# Patient Record
Sex: FEMALE | Race: BLACK OR AFRICAN AMERICAN | Employment: OTHER | URBAN - METROPOLITAN AREA
[De-identification: names, ages, dates, MRNs, and addresses within clinical notes are randomized per-mention and may not be internally consistent; named-entity substitution may affect disease eponyms.]

---

## 2017-03-15 ENCOUNTER — HOSPITAL ENCOUNTER (OUTPATIENT)
Dept: WOMENS IMAGING | Age: 67
Discharge: HOME | End: 2017-03-16
Attending: OBSTETRICS & GYNECOLOGY | Admitting: OBSTETRICS & GYNECOLOGY

## 2017-03-15 DIAGNOSIS — Z12.31 VISIT FOR SCREENING MAMMOGRAM: ICD-10-CM

## 2017-03-25 ENCOUNTER — HOSPITAL ENCOUNTER (OUTPATIENT)
Dept: WOMENS IMAGING | Age: 67
Discharge: HOME | End: 2017-03-15

## 2017-08-03 ENCOUNTER — OFFICE VISIT (OUTPATIENT)
Dept: ORTHOPEDIC SURGERY | Age: 67
End: 2017-08-03

## 2017-08-03 VITALS
TEMPERATURE: 98.1 F | BODY MASS INDEX: 36.96 KG/M2 | HEART RATE: 91 BPM | WEIGHT: 230 LBS | DIASTOLIC BLOOD PRESSURE: 87 MMHG | HEIGHT: 66 IN | SYSTOLIC BLOOD PRESSURE: 132 MMHG

## 2017-08-03 DIAGNOSIS — Z96.649 STATUS POST REVISION OF TOTAL HIP REPLACEMENT: Primary | ICD-10-CM

## 2017-08-03 DIAGNOSIS — M54.5 LOW BACK PAIN, UNSPECIFIED BACK PAIN LATERALITY, UNSPECIFIED CHRONICITY, WITH SCIATICA PRESENCE UNSPECIFIED: ICD-10-CM

## 2017-08-03 DIAGNOSIS — Z96.642 HISTORY OF TOTAL LEFT HIP ARTHROPLASTY: ICD-10-CM

## 2017-08-03 PROCEDURE — 99213 OFFICE O/P EST LOW 20 MIN: CPT | Performed by: PHYSICIAN ASSISTANT

## 2017-08-03 RX ORDER — CYCLOBENZAPRINE HCL 10 MG
10 TABLET ORAL 3 TIMES DAILY PRN
Qty: 40 TABLET | Refills: 0 | Status: SHIPPED | OUTPATIENT
Start: 2017-08-03 | End: 2017-08-13

## 2017-08-03 RX ORDER — TRAMADOL HYDROCHLORIDE 50 MG/1
50 TABLET ORAL EVERY 6 HOURS PRN
Qty: 40 TABLET | Refills: 0 | Status: SHIPPED | OUTPATIENT
Start: 2017-08-03 | End: 2017-08-13

## 2017-08-09 ENCOUNTER — TELEPHONE (OUTPATIENT)
Dept: ORTHOPEDIC SURGERY | Age: 67
End: 2017-08-09

## 2017-08-09 DIAGNOSIS — M54.5 LOW BACK PAIN, UNSPECIFIED BACK PAIN LATERALITY, UNSPECIFIED CHRONICITY, WITH SCIATICA PRESENCE UNSPECIFIED: Primary | ICD-10-CM

## 2017-08-16 ENCOUNTER — HOSPITAL ENCOUNTER (OUTPATIENT)
Dept: MRI IMAGING | Age: 67
Discharge: HOME | End: 2017-08-16
Attending: ORTHOPAEDIC SURGERY | Admitting: ORTHOPAEDIC SURGERY

## 2017-08-16 DIAGNOSIS — M54.50 LOW BACK PAIN: ICD-10-CM

## 2017-08-16 DIAGNOSIS — M54.5 LOW BACK PAIN, UNSPECIFIED BACK PAIN LATERALITY, UNSPECIFIED CHRONICITY, WITH SCIATICA PRESENCE UNSPECIFIED: ICD-10-CM

## 2017-08-31 ENCOUNTER — OFFICE VISIT (OUTPATIENT)
Dept: ORTHOPEDIC SURGERY | Age: 67
End: 2017-08-31

## 2017-08-31 VITALS — BODY MASS INDEX: 36.96 KG/M2 | TEMPERATURE: 99.2 F | WEIGHT: 230 LBS | HEIGHT: 66 IN | RESPIRATION RATE: 16 BRPM

## 2017-08-31 DIAGNOSIS — M48.061 SPINAL STENOSIS OF LUMBAR REGION: Primary | ICD-10-CM

## 2017-08-31 PROCEDURE — 99213 OFFICE O/P EST LOW 20 MIN: CPT | Performed by: PHYSICIAN ASSISTANT

## 2020-09-30 ENCOUNTER — TELEPHONE (OUTPATIENT)
Dept: ORTHOPEDIC SURGERY | Age: 70
End: 2020-09-30

## 2020-09-30 NOTE — TELEPHONE ENCOUNTER
I called the patient and left a message.   [facility] is unable to give handicap parking letter due to the patient not being seen in 3 years

## 2020-10-05 ENCOUNTER — OFFICE VISIT (OUTPATIENT)
Dept: ORTHOPEDIC SURGERY | Age: 70
End: 2020-10-05
Payer: MEDICARE

## 2020-10-05 VITALS — TEMPERATURE: 97.5 F

## 2020-10-05 PROCEDURE — 99213 OFFICE O/P EST LOW 20 MIN: CPT | Performed by: PHYSICIAN ASSISTANT

## 2020-10-05 NOTE — LETTER
[facility]  [place] [address] [address] [address]  Phone: [phone]  Fax: [phone]    Marcela Warner MD        October 5, 2020     Patient: Taniya Crawford   YOB: 1950   Date of Visit: 10/5/2020       To Whom It May Concern: It is my medical opinion that [facility] requires a disability parking placard for the following reasons:  She has limited walking ability due to an arthritic condition. Duration of need: permanent    If you have any questions or concerns, please don't hesitate to call.     Sincerely,           Marcela Warner MD

## 2020-10-05 NOTE — PROGRESS NOTES
Subjective:      Patient ID: Gallo Dumont is a 79 y.o.  female. Chief Complaint   Patient presents with    Hip Problem     Right revision JAMILA 8.12.2011    Hip Problem     Left JAMILA 2002        HPI:  She is here for annual follow up on left and right hip arthroplasty. The date of procedure: Left 2002. Right 2011. Denies any complaints. Review of Systems:   A 14 point review of systems and history form completed by the patient has been reviewed. This form is scanned in the media tab of the patient's chart under today's date. Past Medical History:   Diagnosis Date    Arthritis     Hypertension        Family History   Problem Relation Age of Onset    Arthritis Unknown     Cancer Unknown     Diabetes Unknown     Heart Disease Unknown     High Blood Pressure Unknown        Past Surgical History:   Procedure Laterality Date    HIP ARTHROPLASTY  2007    right hip    REVISION TOTAL HIP ARTHROPLASTY  8/12/11    Right - revision JAMILA    TOTAL HIP ARTHROPLASTY  2002    left       Social History     Tobacco Use    Smoking status: Never Smoker    Smokeless tobacco: Never Used   Substance Use Topics    Alcohol use: No    Drug use: No       Current Outpatient Medications   Medication Sig Dispense Refill    cephALEXin (KEFLEX) 500 MG capsule Take 2 tablets one hour before and 2 tablets one hour after dental procedure 8 capsule 0    cyclobenzaprine (FLEXERIL) 10 MG tablet Take 10 mg by mouth 3 times daily as needed for Muscle spasms.  lisinopril (PRINIVIL;ZESTRIL) 10 MG tablet Take 10 mg by mouth daily.  Flaxseed Oil OIL by Does not apply route.  Multiple Vitamin (MULTI-VITAMIN PO) Take  by mouth.  b complex vitamins tablet Take 1 tablet by mouth daily. No current facility-administered medications for this visit. Objective:   She  is alert, oriented x 3, pleasant, well nourished, developed and in no acute distress.     Temp 97.5 °F (36.4 °C) (Temporal) HIP EXAM:  Examination of the left hip shows: There is no pain with internal and external rotation. There is no pain with flexion and extension. There is no pain with active SLR. There is no pain with weight bearing. Examination of the right hip shows: There is no pain with internal and external rotation. There is no pain with flexion and extension. There is no pain with active SLR. There is no pain with weight bearing. Examination of the lower extremities are intact with sensation to light touch. Motor testing  5/5 in all major motor groups of the lower extremities. Gait is normal heel to toe. Gait is not antalgic. Negative Odonnell's Sign. SLR negative. Examination of the lower extremities shows intact perfusion to all extremities. No cyanosis. Digits are warm to touch, capillary refill is less than 2 seconds. There is mild edema noted. Examination of the skin over both lower extremities reveals: The skin to be intact without lacerations or abrasions. No significant erythema. No rashes or skin lesions. X Rays: performed in the office today:     AP Pelvis, AP and Frog Leg Lateral Left and Right Hip: There is a left and right total hip arthroplasty present. Left hip arthroplasty is a longer stemmed S-ROM hip arthroplasty. The alignment is satisfactory. There are no signs of failure, dislocation or loosening. Assessment:       ICD-10-CM    1. 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 History of total left hip arthroplasty-2002  Z96.642 XR HIP 3-4 VW W PELVIS BILATERAL        Plan:     Clinically and radiographically stable left and right hip arthroplasty. Activity restrictions discussed today. Need for dental prophylactics discussed today. Recommend annual x ray evaluation. Follow Up: 12 months.   Call or return to clinic prn if these symptoms worsen or fail to improve as anticipated.

## 2023-08-14 ENCOUNTER — HOSPITAL ENCOUNTER (OUTPATIENT)
Age: 73
Setting detail: OUTPATIENT SURGERY
Discharge: HOME | End: 2023-08-14
Attending: INTERNAL MEDICINE | Admitting: INTERNAL MEDICINE
Payer: MEDICARE

## 2023-08-14 VITALS
RESPIRATION RATE: 16 BRPM | BODY MASS INDEX: 35.32 KG/M2 | DIASTOLIC BLOOD PRESSURE: 84 MMHG | TEMPERATURE: 97.3 F | OXYGEN SATURATION: 100 % | WEIGHT: 212 LBS | SYSTOLIC BLOOD PRESSURE: 121 MMHG | HEART RATE: 72 BPM | HEIGHT: 65 IN

## 2023-08-14 DIAGNOSIS — Z86.010 HISTORY OF COLON POLYPS: ICD-10-CM

## 2023-08-14 PROCEDURE — 99153 MOD SED SAME PHYS/QHP EA: CPT | Performed by: INTERNAL MEDICINE

## 2023-08-14 PROCEDURE — 99152 MOD SED SAME PHYS/QHP 5/>YRS: CPT | Performed by: INTERNAL MEDICINE

## 2023-08-14 PROCEDURE — 6360000002 HC RX W HCPCS: Performed by: INTERNAL MEDICINE

## 2023-08-14 PROCEDURE — 2580000003 HC RX 258: Performed by: INTERNAL MEDICINE

## 2023-08-14 PROCEDURE — 7100000011 HC PHASE II RECOVERY - ADDTL 15 MIN: Performed by: INTERNAL MEDICINE

## 2023-08-14 PROCEDURE — 88305 TISSUE EXAM BY PATHOLOGIST: CPT

## 2023-08-14 PROCEDURE — 7100000010 HC PHASE II RECOVERY - FIRST 15 MIN: Performed by: INTERNAL MEDICINE

## 2023-08-14 PROCEDURE — 2709999900 HC NON-CHARGEABLE SUPPLY: Performed by: INTERNAL MEDICINE

## 2023-08-14 PROCEDURE — 3609010600 HC COLONOSCOPY POLYPECTOMY SNARE/COLD BIOPSY: Performed by: INTERNAL MEDICINE

## 2023-08-14 RX ORDER — MIDAZOLAM HYDROCHLORIDE 1 MG/ML
INJECTION INTRAMUSCULAR; INTRAVENOUS PRN
Status: DISCONTINUED | OUTPATIENT
Start: 2023-08-14 | End: 2023-08-14 | Stop reason: ALTCHOICE

## 2023-08-14 RX ORDER — SODIUM CHLORIDE, SODIUM LACTATE, POTASSIUM CHLORIDE, CALCIUM CHLORIDE 600; 310; 30; 20 MG/100ML; MG/100ML; MG/100ML; MG/100ML
INJECTION, SOLUTION INTRAVENOUS ONCE
Status: COMPLETED | OUTPATIENT
Start: 2023-08-14 | End: 2023-08-14

## 2023-08-14 RX ORDER — FENTANYL CITRATE 50 UG/ML
INJECTION, SOLUTION INTRAMUSCULAR; INTRAVENOUS PRN
Status: DISCONTINUED | OUTPATIENT
Start: 2023-08-14 | End: 2023-08-14 | Stop reason: ALTCHOICE

## 2023-08-14 RX ADMIN — SODIUM CHLORIDE, POTASSIUM CHLORIDE, SODIUM LACTATE AND CALCIUM CHLORIDE: 600; 310; 30; 20 INJECTION, SOLUTION INTRAVENOUS at 09:08

## 2023-08-14 ASSESSMENT — PULMONARY FUNCTION TESTS
PIF_VALUE: 1

## 2023-08-14 ASSESSMENT — PAIN SCALES - WONG BAKER
WONGBAKER_NUMERICALRESPONSE: 0

## 2023-08-14 ASSESSMENT — PAIN SCALES - GENERAL
PAINLEVEL_OUTOF10: 0

## 2023-08-14 ASSESSMENT — PAIN - FUNCTIONAL ASSESSMENT: PAIN_FUNCTIONAL_ASSESSMENT: 0-10

## 2023-08-14 NOTE — H&P
they do not hear from my office within 1-2 weeks. All questions answered.     Donna Hopkins MD  8/14/2023

## 2023-08-14 NOTE — PROGRESS NOTES
Ambulatory Surgery/Procedure Discharge Note    Vitals:    08/14/23 1100   BP: 121/84   Pulse: 72   Resp: 16   Temp:    SpO2:        No intake/output data recorded. Restroom use offered before discharge. Yes    Pain assessment:  level of pain (1-10, 10 severe),   Pain Level: 0    Pt and S.O./family states \"ready to go home\". Pt alert and oriented x4. IV removed. Denies N/V or pain. Voided prior to discharge. Discharge instructions given to pt and daughter with pt permission. Pt and daughter verbalized understanding of all instructions. Left with all belongings, and discharge instructions. Patient discharged to home/self care.  Patient discharged via wheel chair by transporter to waiting family/S.O.       8/14/2023 11:06 AM

## 2023-08-21 ENCOUNTER — OFFICE VISIT (OUTPATIENT)
Dept: ORTHOPEDIC SURGERY | Age: 73
End: 2023-08-21
Payer: MEDICARE

## 2023-08-21 VITALS — HEIGHT: 65 IN | BODY MASS INDEX: 35.32 KG/M2 | RESPIRATION RATE: 16 BRPM | WEIGHT: 212 LBS

## 2023-08-21 DIAGNOSIS — Z96.642 HISTORY OF TOTAL LEFT HIP ARTHROPLASTY: ICD-10-CM

## 2023-08-21 DIAGNOSIS — Z96.649 STATUS POST REVISION OF TOTAL HIP REPLACEMENT: Primary | ICD-10-CM

## 2023-08-21 PROCEDURE — 99213 OFFICE O/P EST LOW 20 MIN: CPT | Performed by: PHYSICIAN ASSISTANT

## 2023-08-21 PROCEDURE — 1123F ACP DISCUSS/DSCN MKR DOCD: CPT | Performed by: PHYSICIAN ASSISTANT

## 2023-08-21 NOTE — PROGRESS NOTES
Subjective:      Patient ID: Cely Marcelino is a 68 y.o.  female. Chief Complaint   Patient presents with    Hip Pain     Right revision JAMILA 8/12/20211  Left JAMILA 2002        HPI:  She is here for annual follow up on left and right hip arthroplasty. The date of procedure:   Left SROM JAMILA 2002  Right Revision JAMILA 8/2011. Surgeon: Castro Mazariegos. While visiting family in [place], she slipped on a muddy incline and dislocated her left hip. She was taken to a local ER, Relocation unsuccessful in the ER. The following day she underwent a closed reduction in the OR. She states at the time of the dislocation, her foot was pointed inward. PProbably had a posterior hip dislocation. No x-rays available to confirm. Review of Systems:   Negative for fever or chills. A 14 point review of systems and history form completed by the patient has been reviewed. This form is scanned in the media tab of the patient's chart under today's date.       Past Medical History:   Diagnosis Date    Arthritis     Hypertension        Family History   Problem Relation Age of Onset    Arthritis Other     Cancer Other     Diabetes Other     Heart Disease Other     High Blood Pressure Other        Past Surgical History:   Procedure Laterality Date    COLONOSCOPY N/A 8/14/2023    COLONOSCOPY POLYPECTOMY SNARE/COLD BIOPSY performed by Kandra Severs, MD at [address]  01/01/2007    right hip    [place]  08/12/2011    Right - revision JAMILA    SHOULDER ARTHROPLASTY Left     TONSILLECTOMY      TOTAL HIP ARTHROPLASTY  01/01/2002    left    TOTAL KNEE ARTHROPLASTY Bilateral        Social History     Tobacco Use    Smoking status: Never    Smokeless tobacco: Never   Substance Use Topics    Alcohol use: No    Drug use: No       Current Outpatient Medications   Medication Sig Dispense Refill    Cholecalciferol (VITAMIN D3 PO) Take by mouth      MAGNESIUM PO Take by mouth      cephALEXin (KEFLEX)

## (undated) DEVICE — CANNULA SAMP CO2 AD GRN 7FT CO2 AND 7FT O2 TBNG UNIV CONN

## (undated) DEVICE — FORCEPS BX L240CM JAW DIA2.4MM ORNG L CAP W/ NDL DISP RAD